# Patient Record
Sex: FEMALE | Race: OTHER | HISPANIC OR LATINO | Employment: FULL TIME | URBAN - METROPOLITAN AREA
[De-identification: names, ages, dates, MRNs, and addresses within clinical notes are randomized per-mention and may not be internally consistent; named-entity substitution may affect disease eponyms.]

---

## 2022-01-19 ENCOUNTER — OCCUPATIONAL HEALTH (OUTPATIENT)
Dept: URGENT CARE | Facility: CLINIC | Age: 37
End: 2022-01-19

## 2022-01-19 DIAGNOSIS — Z00.00 ENCOUNTER FOR PHYSICAL EXAMINATION: Primary | ICD-10-CM

## 2022-01-19 PROCEDURE — 80305 OOH NON-DOT DRUG SCREEN: ICD-10-PCS | Mod: S$GLB,,, | Performed by: NURSE PRACTITIONER

## 2022-01-19 PROCEDURE — 99499 UNLISTED E&M SERVICE: CPT | Mod: S$GLB,,, | Performed by: NURSE PRACTITIONER

## 2022-01-19 PROCEDURE — 99499 PHYSICAL, BASIC COMPLEXITY: ICD-10-PCS | Mod: S$GLB,,, | Performed by: NURSE PRACTITIONER

## 2022-01-19 PROCEDURE — 80305 DRUG TEST PRSMV DIR OPT OBS: CPT | Mod: S$GLB,,, | Performed by: NURSE PRACTITIONER

## 2022-01-19 NOTE — PROGRESS NOTES
EC     Patient requesting refill on Metoprolol medication. She reports she takes it as needed for her blood pressure. Refills could not be found in [facility], noted in 2016 it was discontinued by an outside provider.     Patient reports the bottle she is taking now is currently  and was prescribed by Dr. Erlin Clark \"years ago\" but does not see her anymore. She checks her blood pressure approximately 3x a week with an automatic cuff and states they average in the 130/80s. If they go higher, she will take her  medication.    Denies chest pain, shortness of breath, fever, nausea, vomiting, diarrhea, blurred vision, or headaches.    Patient informed she should not be taking medication that is not monitored by a provider. She states she was told at her last office visit that Metoprolol would be refilled and managed by PCP. No notes available. Patient will need to discuss issues with PCP and advise on refills if they are necessary. Appointment scheduled 18. If any further questions or concerns, return call to the office. Patient agreeable and verbalized complete understanding.